# Patient Record
Sex: MALE | Race: BLACK OR AFRICAN AMERICAN | NOT HISPANIC OR LATINO | Employment: FULL TIME | URBAN - METROPOLITAN AREA
[De-identification: names, ages, dates, MRNs, and addresses within clinical notes are randomized per-mention and may not be internally consistent; named-entity substitution may affect disease eponyms.]

---

## 2020-08-01 ENCOUNTER — HOSPITAL ENCOUNTER (EMERGENCY)
Facility: HOSPITAL | Age: 30
Discharge: HOME | End: 2020-08-02
Attending: EMERGENCY MEDICINE
Payer: MEDICAID

## 2020-08-01 DIAGNOSIS — T63.691A MARINE ANIMAL STING, ACCIDENTAL OR UNINTENTIONAL, INITIAL ENCOUNTER: Primary | ICD-10-CM

## 2020-08-01 DIAGNOSIS — Z18.9 RETAINED FOREIGN BODY: ICD-10-CM

## 2020-08-01 PROCEDURE — 63600175 PHARM REV CODE 636 W HCPCS: Mod: SL,ER | Performed by: EMERGENCY MEDICINE

## 2020-08-01 PROCEDURE — 10120 INC&RMVL FB SUBQ TISS SMPL: CPT | Mod: F2,ER

## 2020-08-01 PROCEDURE — 90715 TDAP VACCINE 7 YRS/> IM: CPT | Mod: SL,ER | Performed by: EMERGENCY MEDICINE

## 2020-08-01 PROCEDURE — 25000003 PHARM REV CODE 250: Mod: ER | Performed by: EMERGENCY MEDICINE

## 2020-08-01 PROCEDURE — 90471 IMMUNIZATION ADMIN: CPT | Mod: VFC,ER | Performed by: EMERGENCY MEDICINE

## 2020-08-01 PROCEDURE — 99284 EMERGENCY DEPT VISIT MOD MDM: CPT | Mod: 25,ER

## 2020-08-01 RX ORDER — LIDOCAINE HYDROCHLORIDE 10 MG/ML
5 INJECTION INFILTRATION; PERINEURAL
Status: DISCONTINUED | OUTPATIENT
Start: 2020-08-01 | End: 2020-08-01

## 2020-08-01 RX ORDER — LEVOFLOXACIN 500 MG/1
500 TABLET, FILM COATED ORAL
Status: DISCONTINUED | OUTPATIENT
Start: 2020-08-01 | End: 2020-08-01

## 2020-08-01 RX ORDER — BACITRACIN 500 [USP'U]/G
OINTMENT TOPICAL
Status: COMPLETED | OUTPATIENT
Start: 2020-08-01 | End: 2020-08-02

## 2020-08-01 RX ORDER — LEVOFLOXACIN 750 MG/1
750 TABLET ORAL
Status: COMPLETED | OUTPATIENT
Start: 2020-08-01 | End: 2020-08-01

## 2020-08-01 RX ORDER — CEPHALEXIN 500 MG/1
500 CAPSULE ORAL
Status: COMPLETED | OUTPATIENT
Start: 2020-08-01 | End: 2020-08-01

## 2020-08-01 RX ORDER — LIDOCAINE HYDROCHLORIDE 10 MG/ML
5 INJECTION, SOLUTION EPIDURAL; INFILTRATION; INTRACAUDAL; PERINEURAL
Status: COMPLETED | OUTPATIENT
Start: 2020-08-01 | End: 2020-08-01

## 2020-08-01 RX ADMIN — CEPHALEXIN 500 MG: 500 CAPSULE ORAL at 10:08

## 2020-08-01 RX ADMIN — LIDOCAINE HYDROCHLORIDE 50 MG: 10 INJECTION, SOLUTION EPIDURAL; INFILTRATION; INTRACAUDAL; PERINEURAL at 11:08

## 2020-08-01 RX ADMIN — CLOSTRIDIUM TETANI TOXOID ANTIGEN (FORMALDEHYDE INACTIVATED), CORYNEBACTERIUM DIPHTHERIAE TOXOID ANTIGEN (FORMALDEHYDE INACTIVATED), BORDETELLA PERTUSSIS TOXOID ANTIGEN (GLUTARALDEHYDE INACTIVATED), BORDETELLA PERTUSSIS FILAMENTOUS HEMAGGLUTININ ANTIGEN (FORMALDEHYDE INACTIVATED), BORDETELLA PERTUSSIS PERTACTIN ANTIGEN, AND BORDETELLA PERTUSSIS FIMBRIAE 2/3 ANTIGEN 0.5 ML: 5; 2; 2.5; 5; 3; 5 INJECTION, SUSPENSION INTRAMUSCULAR at 10:08

## 2020-08-01 RX ADMIN — LEVOFLOXACIN 750 MG: 750 TABLET, FILM COATED ORAL at 11:08

## 2020-08-01 NOTE — Clinical Note
Carson Allen was seen and treated in our emergency department on 8/1/2020.  He may return to work on 08/10/2020.       If you have any questions or concerns, please don't hesitate to call.      Jacob Paez MD

## 2020-08-01 NOTE — Clinical Note
Carson Allen was seen and treated in our emergency department on 8/1/2020.  He may return to work on 08/03/2020.  Light Duty until cleared by Orthopedics.      If you have any questions or concerns, please don't hesitate to call.      Karla CASTRO RN RN

## 2020-08-02 VITALS
WEIGHT: 260 LBS | BODY MASS INDEX: 34.46 KG/M2 | SYSTOLIC BLOOD PRESSURE: 141 MMHG | DIASTOLIC BLOOD PRESSURE: 86 MMHG | OXYGEN SATURATION: 100 % | TEMPERATURE: 98 F | HEIGHT: 73 IN | RESPIRATION RATE: 18 BRPM | HEART RATE: 56 BPM

## 2020-08-02 PROCEDURE — 25000003 PHARM REV CODE 250: Mod: ER | Performed by: EMERGENCY MEDICINE

## 2020-08-02 PROCEDURE — 10120 INC&RMVL FB SUBQ TISS SMPL: CPT | Mod: F2,ER

## 2020-08-02 RX ORDER — LIDOCAINE HYDROCHLORIDE 10 MG/ML
5 INJECTION, SOLUTION EPIDURAL; INFILTRATION; INTRACAUDAL; PERINEURAL
Status: COMPLETED | OUTPATIENT
Start: 2020-08-02 | End: 2020-08-02

## 2020-08-02 RX ORDER — HYDROCODONE BITARTRATE AND ACETAMINOPHEN 5; 325 MG/1; MG/1
1 TABLET ORAL EVERY 4 HOURS PRN
Qty: 18 TABLET | Refills: 0 | Status: SHIPPED | OUTPATIENT
Start: 2020-08-02

## 2020-08-02 RX ORDER — LEVOFLOXACIN 500 MG/1
500 TABLET, FILM COATED ORAL DAILY
Qty: 5 TABLET | Refills: 0 | Status: SHIPPED | OUTPATIENT
Start: 2020-08-02 | End: 2020-08-09

## 2020-08-02 RX ORDER — CEPHALEXIN 500 MG/1
500 CAPSULE ORAL EVERY 8 HOURS
Qty: 20 CAPSULE | Refills: 0 | Status: SHIPPED | OUTPATIENT
Start: 2020-08-02 | End: 2020-08-09

## 2020-08-02 RX ADMIN — LIDOCAINE HYDROCHLORIDE 50 MG: 10 INJECTION, SOLUTION EPIDURAL; INFILTRATION; INTRACAUDAL; PERINEURAL at 01:08

## 2020-08-02 RX ADMIN — BACITRACIN: 500 OINTMENT TOPICAL at 01:08

## 2020-08-02 NOTE — DISCHARGE INSTRUCTIONS
You were seen today for the catfish mahamed in your finger.  Please schedule an appointment this week for a recheck of the wound of your hand.  Make sure you take the antibiotics prescribed you, do a warm water soak of your hand once today.  Returning case of fevers chills worsening swelling redness.

## 2020-08-02 NOTE — ED PROVIDER NOTES
Encounter Date: 8/1/2020       History     Chief Complaint   Patient presents with    fish fin in finger     catfish fin in left middle finger. no bleeding. last tetanus >5yrs ago.      This is a 29-year-old healthy man that presents for evaluation a catfish mahamed getting stuck in his left middle finger.  He did not try to take anything to help with that.  Nothing has seemed to make it better or worse.  Denies any injury elsewhere.        Review of patient's allergies indicates:  No Known Allergies  History reviewed. No pertinent past medical history.  Past Surgical History:   Procedure Laterality Date    KNEE ARTHROSCOPY W/ PCL AND LCL  REPAIR & TENDON GRAFT       History reviewed. No pertinent family history.  Social History     Tobacco Use    Smoking status: Current Some Day Smoker     Types: Cigars    Smokeless tobacco: Never Used   Substance Use Topics    Alcohol use: Yes     Comment: social    Drug use: Never     Review of Systems  Constitutional-no fever no chills  HEENT-no congestion, no ear pain, no nose bleed, no sinus pain,  Eyes-no discharge, no itching, no redness, no visual change  Respiratory-no apnea, no chest tightness, no choking, no cough, no shortness of breath, no wheezing  Cardio-no chest pain  GI-no distention, no abdominal pain, no diarrhea, no constipation  Endocrine-no cold intolerance, no heat intolerance  -no difficulty urinating, no dysuria, no flank pain,  MSK-pain in his left hand  Skin-no rashes  Allergy-no environmental allergy  Neurologic-no dizziness, no headache, no numbness, no seizure  Hematology-no swollen nodes  Behavioral-no confusion, no hallucinations, no nervousness  Physical Exam     Initial Vitals [08/01/20 2157]   BP Pulse Resp Temp SpO2   (!) 140/83 76 18 98.8 °F (37.1 °C) 100 %      MAP       --         Physical ExamWell appearing, no distress.  Eyes: Conjunctivae normal.  ENT       Head: Normocephalic, atraumatic.       Nose: No congestion.       Mouth/Throat:  Mucous membranes are moist.  Hematological/Lymphatic/Immunilogical: No cervical lymphadenopathy.  Cardiovascular: Normal rate, regular rhythm. Normal and symmetric distal pulses.  Respiratory: Normal respiratory effort. Breath sounds are normal.  Gastrointestinal: Soft, nontender.   Musculoskeletal:  Single punctate wound in the MIP of the left middle finger, tender to palpation  Neurologic: Alert, oriented. Normal speech and language. No gross focal neurologic deficits are appreciated.  Skin: Skin is warm, dry. No rash noted.  Psychiatric: Mood and affect are normal.   ED Course   Foreign Body    Date/Time: 8/2/2020 3:50 AM  Performed by: Jacob Paez MD  Authorized by: Jacob Paez MD   Body area: skin  General location: upper extremity  Location details: left long finger  Anesthesia: local infiltration    Anesthesia:  Local Anesthetic: lidocaine 1% without epinephrine  Anesthetic total: 10 mL  Patient sedated: no  Patient restrained: no  Patient cooperative: yes  Localization method: serial x-rays  Removal mechanism: forceps, irrigation, scalpel and hemostat  Dressing: antibiotic ointment  Tendon involvement: superficial  Depth: deep  Complexity: complex  0 objects recovered.  Post-procedure assessment: foreign body not removed      Labs Reviewed - No data to display       Imaging Results          X-Ray Finger 2 or More Views Left (Final result)  Result time 08/01/20 23:48:42    Final result by KIMBERLY Bansal Sr., MD (08/01/20 23:48:42)                 Impression:      There is a 7 mm cesar-shaped foreign body in the soft tissue volar to the distal diaphyseal portion of the middle phalanx of the long finger.      Electronically signed by: Rosales Bansal MD  Date:    08/01/2020  Time:    23:48             Narrative:    EXAMINATION:  XR FINGER 2 OR MORE VIEWS LEFT    CLINICAL HISTORY:  foreign body;    COMPARISON:  A plain film examination of the left finger performed at 22:07 on  08/01/2020.    FINDINGS:  There is a 7 mm cesar-shaped foreign body in the soft tissue volar to the distal diaphyseal portion of the middle phalanx of the long finger.  There is no fracture. There is no dislocation.                               X-Ray Finger 2 or More Views Left (Final result)  Result time 08/01/20 22:32:59    Final result by KIMBERLY Bansal Sr., MD (08/01/20 22:32:59)                 Impression:      There is a 6 mm cesar-shaped radiopaque object in the soft tissue volar and the lateral to the distal diaphyseal portion of the middle phalanx of the long finger.  This is consistent with the patient's history and characteristic of a foreign body.      Electronically signed by: Rosales Bansal MD  Date:    08/01/2020  Time:    22:32             Narrative:    EXAMINATION:  XR FINGER 2 OR MORE VIEWS LEFT    CLINICAL HISTORY:  foreign body;    COMPARISON:  None    FINDINGS:  There is a 6 mm cesar-shaped radiopaque object in the soft tissue volar and the lateral to the distal diaphyseal portion of the middle phalanx of the long finger.  There is no fracture. There is no dislocation.                                 Medical Decision Making:   Initial Assessment:   29-year-old with a catfish mahamed staying in the hand.  Independently Interpreted Test(s):   I have ordered and independently interpreted X-rays - see prior notes.  ED Management:  Attempted removal of catfish mahamed, despite repeated attempts including irrigation, forceps, alligator forceps, tweezers, was unable to remove mahamed, did prophylax for potential envenomation in water with Levaquin as well as Keflex.  Discussed his need for specialty follow-up for evaluation of his hand in the foreign body.  He had intact range of motion distally at the time of discharge and brisk capillary refill.  Because of his needs for specialty follow-up I did ensure that he is able to have reliable transportation as he is believe he should follow up in the Hand Clinic at  [facility].  Will give him antibiotics to use in the coming week as well as analgesics.                                 Clinical Impression:       ICD-10-CM ICD-9-CM   1. Marine animal sting, accidental or unintentional, initial encounter  T63.691A 989.5     E905.6   2. Retained foreign body  Z18.9 V90.9             ED Disposition Condition    Discharge Stable        ED Prescriptions     Medication Sig Dispense Start Date End Date Auth. Provider    levoFLOXacin (LEVAQUIN) 500 MG tablet Take 1 tablet (500 mg total) by mouth once daily. for 7 days 5 tablet 8/2/2020 8/9/2020 Jacob Paez MD    cephALEXin (KEFLEX) 500 MG capsule Take 1 capsule (500 mg total) by mouth every 8 (eight) hours. for 7 days 20 capsule 8/2/2020 8/9/2020 Jacob Paez MD    HYDROcodone-acetaminophen (NORCO) 5-325 mg per tablet Take 1 tablet by mouth every 4 (four) hours as needed for Pain. 18 tablet 8/2/2020  Jacob Paez MD        Follow-up Information     Follow up With Specialties Details Why Contact Info Additional Information    [place] [address] [facility] Schedule an appointment as soon as possible for a visit in 3 days If symptoms worsen, For a follow up visit about today [address]  [facility] [address] [phone] [facility], 9th Floor Please park in [address] and use [place] elevators                                     Jacob Paez MD  08/02/20 035

## 2020-08-03 ENCOUNTER — TELEPHONE (OUTPATIENT)
Dept: ORTHOPEDICS | Facility: CLINIC | Age: 30
End: 2020-08-03

## 2020-08-03 ENCOUNTER — HOSPITAL ENCOUNTER (EMERGENCY)
Facility: HOSPITAL | Age: 30
Discharge: HOME | End: 2020-08-03
Attending: EMERGENCY MEDICINE
Payer: MEDICAID

## 2020-08-03 VITALS
HEIGHT: 73 IN | DIASTOLIC BLOOD PRESSURE: 61 MMHG | SYSTOLIC BLOOD PRESSURE: 142 MMHG | WEIGHT: 262.44 LBS | HEART RATE: 70 BPM | OXYGEN SATURATION: 98 % | TEMPERATURE: 98 F | BODY MASS INDEX: 34.78 KG/M2 | RESPIRATION RATE: 18 BRPM

## 2020-08-03 DIAGNOSIS — Z51.89 ENCOUNTER FOR WOUND RE-CHECK: Primary | ICD-10-CM

## 2020-08-03 DIAGNOSIS — M79.5 RETAINED FOREIGN BODY OF FINGER: ICD-10-CM

## 2020-08-03 PROCEDURE — 99282 EMERGENCY DEPT VISIT SF MDM: CPT

## 2020-08-03 NOTE — TELEPHONE ENCOUNTER
Left patient a voicemail stating we are not currently taking any new Medicaid adult patients at this time.I left the phone number for the [place] location that does take it at [phone].Also if he can not get a appointment he can call the medicaid escalation line at [phone].----- Message from Margo Hall, Patient Care Assistant sent at 8/3/2020 10:13 AM CDT -----  Name of Who is Calling: GAYE MARTINEZ [[id number]]    What is the request in detail:Requesting appointment for Ed follow up. Patient has a object stuck in finger.  Please contact to further discuss and advise      Can the clinic reply by [facility]: No    What [facility] to [facility] Back [facility]:   [phone]

## 2020-08-04 NOTE — ED NOTES
08/04/2020 @ 2350, pt presents to ED reporting he has lost AVS, work excuse and prescriptions given from visit on 08/01/20 .  AVS and work excuse reprinted.  Dr. Paez notified. Dr. Paez reprinting prescriptions for pt. AVS, work excuse, reprinted prescriptions given to pt.

## 2020-08-04 NOTE — DISCHARGE INSTRUCTIONS
Please continue with already prescribed antibiotics to prevent infection and please follow up with orthopedist/hand specialist as already discussed upon prior ED visit.

## 2020-08-04 NOTE — ED PROVIDER NOTES
SCRIBE #1 NOTE: I, Cabrera Bangura, am scribing for, and in the presence of, Cassie Lau MD. I have scribed the entire note.       History     Chief Complaint   Patient presents with    foreign body in finger     catfish fin stuck in finger, was seen yesterday in ed at [facility] and was unable to remove.      Review of patient's allergies indicates:  No Known Allergies      History of Present Illness     HPI    8/3/2020, 7:30 PM   History obtained from the patient      History of Present Illness: Carson Allen is a 29 y.o. male patient who presents to the Emergency Department for evaluation of foreign body in finger which onset suddenly x 2 days ago. Pt had a catfish mahamed stuck in his L middle finger and went to the ED in [facility] where they could not remove and referred pt to hand specialist. Symptoms are constant and moderate in severity. No mitigating or exacerbating factors reported. No associated sxs reported. Patient denies any fever, chills, SOB, CP, abd pain, N/V, back pain, HA, weakness, and all other sxs at this time. Prior tx includes cephalexin. No further complaints or concerns at this time.        Arrival mode: Personal vehicle    PCP: Michael Segura MD        Past Medical History:  No past medical history on file.    Past Surgical History:  Past Surgical History:   Procedure Laterality Date    KNEE ARTHROSCOPY W/ PCL AND LCL  REPAIR & TENDON GRAFT           Family History:  No family history on file.    Social History:  Social History     Tobacco Use    Smoking status: Current Some Day Smoker     Types: Cigars    Smokeless tobacco: Never Used   Substance and Sexual Activity    Alcohol use: Yes     Comment: social    Drug use: Never    Sexual activity: Not on file        Review of Systems     Review of Systems   Constitutional: Negative for chills and fever.   HENT: Negative for sore throat.    Respiratory: Negative for shortness of breath.    Cardiovascular: Negative  "for chest pain.   Gastrointestinal: Negative for abdominal pain, nausea and vomiting.   Genitourinary: Negative for dysuria.   Musculoskeletal: Negative for back pain.   Skin: Positive for wound (L middle finger). Negative for rash.   Neurological: Negative for weakness and headaches.   Hematological: Does not bruise/bleed easily.   All other systems reviewed and are negative.       Physical Exam     Initial Vitals [08/03/20 1821]   BP Pulse Resp Temp SpO2   (!) 142/61 70 18 98 °F (36.7 °C) 98 %      MAP       --          Physical Exam  Nursing Notes and Vital Signs Reviewed.  Constitutional: Patient is in no acute distress. Well-developed and well-nourished.  Head: Atraumatic. Normocephalic.  Eyes: PERRL. EOM intact. Conjunctivae are not pale. No scleral icterus.  ENT: Mucous membranes are moist. Oropharynx is clear and symmetric.    Neck: Supple. Full ROM. No lymphadenopathy.  Cardiovascular: Regular rate. Regular rhythm. No murmurs, rubs, or gallops. Distal pulses are 2+ and symmetric.  Pulmonary/Chest: No respiratory distress. Clear to auscultation bilaterally. No wheezing or rales.  Abdominal: Soft and non-distended.  There is no tenderness.  No rebound, guarding, or rigidity. Good bowel sounds.  Genitourinary: No CVA tenderness  Musculoskeletal: Moves all extremities. No obvious deformities. No edema. No calf tenderness. Small wound noted to middle aspect of middle L finger. No erythema, warmth, drainage, or signs of infection noted.   Skin: Warm and dry.  Neurological:  Alert, awake, and appropriate.  Normal speech.  No acute focal neurological deficits are appreciated.  Psychiatric: Normal affect. Good eye contact. Appropriate in content.     ED Course   Procedures  ED Vital Signs:  Vitals:    08/03/20 1821   BP: (!) 142/61   Pulse: 70   Resp: 18   Temp: 98 °F (36.7 °C)   TempSrc: Oral   SpO2: 98%   Weight: 119 kg (262 lb 7.3 oz)   Height: 6' 1" (1.854 m)       Abnormal Lab Results:  Labs Reviewed - No data " to display     All Lab Results:  None        Imaging Results:  None         The Emergency Provider reviewed the vital signs and test results, which are outlined above.     ED Discussion     7:39 PM: Reassessed pt at this time. Reviewed pt's visit to ER and instructed pt to continue oral abx as prescribed and to f/u with outpatient hand specialist referred to by prior ER. Pt states his condition has improved at this time. Discussed with pt all pertinent ED information. Discussed pt dx and plan of tx. Gave pt all f/u and return to the ED instructions. All questions and concerns were addressed at this time. Pt expresses understanding of information and instructions, and is comfortable with plan to discharge. Pt is stable for discharge.    I discussed with patient and/or family/caretaker that evaluation in the ED does not suggest any emergent or life threatening medical conditions requiring immediate intervention beyond what was provided in the ED, and I believe patient is safe for discharge.  Regardless, an unremarkable evaluation in the ED does not preclude the development or presence of a serious of life threatening condition. As such, patient was instructed to return immediately for any worsening or change in current symptoms.     ED Medication(s):  Medications - No data to display    Discharge Medication List as of 8/3/2020  7:38 PM                                Scribe Attestation:   Scribe #1: I performed the above scribed service and the documentation accurately describes the services I performed. I attest to the accuracy of the note.     Attending:   Physician Attestation Statement for Scribe #1: I, Cassie Lau MD, personally performed the services described in this documentation, as scribed by Cassie Lau, in my presence, and it is both accurate and complete.           Clinical Impression       ICD-10-CM ICD-9-CM   1. Encounter for wound re-check  Z51.89 V58.89   2. Retained foreign body of  finger  M79.5 729.6       Disposition:   Disposition: Discharged  Condition: Stable       Cassie Lau MD  08/03/20 2135

## 2020-09-24 ENCOUNTER — TELEPHONE (OUTPATIENT)
Dept: ORTHOPEDICS | Facility: CLINIC | Age: 30
End: 2020-09-24

## 2020-09-24 NOTE — TELEPHONE ENCOUNTER
Lm on vm advising pt to call office back to reschedule appt in [place] for his finger injury due to his insurance.

## 2020-09-29 ENCOUNTER — TELEPHONE (OUTPATIENT)
Dept: ORTHOPEDICS | Facility: CLINIC | Age: 30
End: 2020-09-29

## 2020-09-29 NOTE — TELEPHONE ENCOUNTER
Lm on vm advising pt to call office back to reschedule appt due to his insurance.  Pt was informed he may be schedule at [facility] for his appt.

## 2020-09-29 NOTE — TELEPHONE ENCOUNTER
3rd attempt , tried calling pt to reschedule appt due to his insurance but no answer.  Tried calling pt's mom, no answer left vm.

## 2023-05-05 DIAGNOSIS — M79.89 OTHER SPECIFIED SOFT TISSUE DISORDERS: Primary | ICD-10-CM

## 2023-05-08 ENCOUNTER — HOSPITAL ENCOUNTER (OUTPATIENT)
Dept: RADIOLOGY | Facility: HOSPITAL | Age: 33
Discharge: HOME | End: 2023-05-08
Attending: PHYSICIAN ASSISTANT
Payer: MEDICAID

## 2023-05-08 DIAGNOSIS — M79.89 OTHER SPECIFIED SOFT TISSUE DISORDERS: ICD-10-CM

## 2023-05-08 PROCEDURE — 76882 US LMTD JT/FCL EVL NVASC XTR: CPT | Mod: 26,LT,, | Performed by: RADIOLOGY

## 2023-05-08 PROCEDURE — 76882 US LMTD JT/FCL EVL NVASC XTR: CPT | Mod: TC,LT

## 2023-05-08 PROCEDURE — 76882 US SOFT TISSUE AXILLA, LEFT: ICD-10-PCS | Mod: 26,LT,, | Performed by: RADIOLOGY
